# Patient Record
Sex: FEMALE | Race: WHITE | URBAN - METROPOLITAN AREA
[De-identification: names, ages, dates, MRNs, and addresses within clinical notes are randomized per-mention and may not be internally consistent; named-entity substitution may affect disease eponyms.]

---

## 2018-02-16 ENCOUNTER — HISTORICAL (OUTPATIENT)
Dept: ANESTHESIOLOGY | Facility: HOSPITAL | Age: 83
End: 2018-02-16

## 2018-02-16 LAB
ABS NEUT (OLG): 4.5 X10(3)/MCL (ref 1.5–6.9)
ALBUMIN SERPL-MCNC: 2.6 GM/DL (ref 3.4–5)
ALBUMIN/GLOB SERPL: 0.5 RATIO
ALP SERPL-CCNC: 107 UNIT/L (ref 30–113)
ALT SERPL-CCNC: 58 UNIT/L (ref 10–45)
APTT PPP: 26.4 SECOND(S) (ref 25–35)
AST SERPL-CCNC: 59 UNIT/L (ref 15–37)
BASOPHILS # BLD AUTO: 0.1 X10(3)/MCL (ref 0–0.1)
BASOPHILS NFR BLD AUTO: 1 % (ref 0–1)
BILIRUB SERPL-MCNC: 0.4 MG/DL (ref 0.1–0.9)
BILIRUBIN DIRECT+TOT PNL SERPL-MCNC: 0.1 MG/DL (ref 0–0.3)
BILIRUBIN DIRECT+TOT PNL SERPL-MCNC: 0.3 MG/DL
BUN SERPL-MCNC: 31 MG/DL (ref 10–20)
CALCIUM SERPL-MCNC: 9.1 MG/DL (ref 8–10.5)
CHLORIDE SERPL-SCNC: 104 MMOL/L (ref 100–108)
CO2 SERPL-SCNC: 28 MMOL/L (ref 21–35)
CREAT SERPL-MCNC: 0.53 MG/DL (ref 0.7–1.3)
EOSINOPHIL # BLD AUTO: 0.3 X10(3)/MCL (ref 0–0.6)
EOSINOPHIL NFR BLD AUTO: 4 % (ref 0–5)
ERYTHROCYTE [DISTWIDTH] IN BLOOD BY AUTOMATED COUNT: 13.6 % (ref 11.5–17)
GLOBULIN SER-MCNC: 4.9 GM/DL
GLUCOSE SERPL-MCNC: 82 MG/DL (ref 75–116)
HCT VFR BLD AUTO: 37.4 % (ref 36–48)
HGB BLD-MCNC: 12 GM/DL (ref 12–16)
INR PPP: 1 (ref 0–1.2)
LYMPHOCYTES # BLD AUTO: 1.6 X10(3)/MCL (ref 0.5–4.1)
LYMPHOCYTES NFR BLD AUTO: 21.8 % (ref 15–40)
MCH RBC QN AUTO: 30 PG (ref 27–34)
MCHC RBC AUTO-ENTMCNC: 32 GM/DL (ref 31–36)
MCV RBC AUTO: 94 FL (ref 80–99)
MONOCYTES # BLD AUTO: 0.8 X10(3)/MCL (ref 0–1.1)
MONOCYTES NFR BLD AUTO: 11 % (ref 4–12)
NEUTROPHILS # BLD AUTO: 4.5 X10(3)/MCL (ref 1.5–6.9)
NEUTROPHILS NFR BLD AUTO: 60 % (ref 43–75)
PLATELET # BLD AUTO: 355 X10(3)/MCL (ref 140–400)
PMV BLD AUTO: 10.2 FL (ref 6.8–10)
POTASSIUM SERPL-SCNC: 3.8 MMOL/L (ref 3.6–5.2)
PROT SERPL-MCNC: 7.5 GM/DL (ref 6.4–8.2)
PROTHROMBIN TIME: 10.7 SECOND(S) (ref 9–12)
RBC # BLD AUTO: 4 X10(6)/MCL (ref 4.2–5.4)
SODIUM SERPL-SCNC: 138 MMOL/L (ref 135–145)
WBC # SPEC AUTO: 7.5 X10(3)/MCL (ref 4.5–11.5)

## 2022-04-30 NOTE — OP NOTE
ADMISSION DIAGNOSIS:  Inadvertent removal of percutaneous gastrostomy tube in a patient with dementia and inability to maintain diet and associated dysphagia.    PROCEDURE PERFORMED:    1. Percutaneous gastrostomy tube removal, completion of removal.  2. Percutaneous gastrostomy tube insertion.    POSTOPERATIVE DIAGNOSES:    1. Dislodgement of percutaneous gastrostomy tube placed 2 weeks ago.  2. Successful removal and replacement of percutaneous gastrostomy tube percutaneously.    HISTORY:  The patient is an 89-year-old with a history of schizophrenia, hypothyroidism, osteoporosis, and type 2 diabetes with associated dementia, dysphagia, and inability to maintain diet.  Patient had a percutaneous gastrostomy tube placed a couple of weeks ago, did well, had an abdominal binder on, and evidently had it taken off and the percutaneous gastrostomy tube was dislodged.  It was dislodged in the subcu space.  Patient was referred to me for replacement of the percutaneous gastrostomy.  The gastrostomy was removed in the OR in the GI suite, under sedation, and then a fresh gastrostomy tube was placed.  The tract was relatively healed and sealed, and as a result, I had to do it percutaneously.  The patient had a gastroscope inserted into the stomach.  Insufflation of the stomach, and then placement of a percutaneous gastrostomy where the old one was.  Patient was prepped and draped in a sterile fashion.  Had local anesthetic injected.  Gastric needle was placed and then had insertion of a wire and a snare was used to grab the wire, brought through the oropharynx, and placed a gastrostomy tube over the guidewire, and into the stomach.  Placement was checked with endoscopy.  Patient had __________ applied.  Sterile dressings were applied.  No problems were encountered.  Abdominal binder was reapplied to hopefully prevent her from removing it once again.         I appreciate the consultation referral from the Cheri  Guero and Dr. Kemar Jones.        BBS/MODL   DD: 02/16/2018 1121   DT: 02/16/2018 1137  Job # [id number]    cc: Kemar Jones, Dr. Cheri Jack